# Patient Record
(demographics unavailable — no encounter records)

---

## 2024-10-24 NOTE — REASON FOR VISIT
[Home] : at home, [unfilled] , at the time of the visit. [Medical Office: ([facility])___] : at the medical office located in  [Patient] : the patient [Self] : self [Follow-up] : a follow-up of an existing diagnosis

## 2024-10-24 NOTE — ASSESSMENT
[FreeTextEntry1] : 60-year-old female 4 cm hiatal hernia GERD/anxiety/depression here for follow-up care.  Patient has had exacerbation of GERD symptoms more recently.  I recommend that she add on liquid Carafate to right before bedtime as a cytoprotective agent.  I have advised her to switch her omeprazole to the morning 30 minutes before breakfast.  She can continue taking the famotidine at night as well.

## 2024-10-24 NOTE — HISTORY OF PRESENT ILLNESS
[FreeTextEntry1] : 60-year-old pleasant female with history of anxiety/depression/former smoker/4 cm hiatal hernia here for follow-up care.  She is in the middle of moving to [place].  Her last EGD was in 2022 Last colonoscopy 2022  She currently is having worsened GERD symptoms.  When she lies down, she will feel regurgitation of liquid refluxate with retrosternal burning.  She has been taking omeprazole in the evening along with famotidine.